# Patient Record
Sex: MALE | Race: OTHER | HISPANIC OR LATINO | Employment: UNEMPLOYED | URBAN - METROPOLITAN AREA
[De-identification: names, ages, dates, MRNs, and addresses within clinical notes are randomized per-mention and may not be internally consistent; named-entity substitution may affect disease eponyms.]

---

## 2023-10-20 ENCOUNTER — HOSPITAL ENCOUNTER (INPATIENT)
Facility: HOSPITAL | Age: 33
LOS: 2 days | Discharge: HOME | DRG: 137 | End: 2023-10-22
Attending: EMERGENCY MEDICINE | Admitting: HOSPITALIST

## 2023-10-20 ENCOUNTER — APPOINTMENT (EMERGENCY)
Dept: CT IMAGING | Facility: HOSPITAL | Age: 33
DRG: 137 | End: 2023-10-20

## 2023-10-20 DIAGNOSIS — K04.7 DENTAL ABSCESS: Primary | ICD-10-CM

## 2023-10-20 DIAGNOSIS — M27.2 SUBPERIOSTEAL ABSCESS OF JAW: ICD-10-CM

## 2023-10-20 DIAGNOSIS — L03.90 CELLULITIS: ICD-10-CM

## 2023-10-20 LAB
ALBUMIN SERPL BCP-MCNC: 4.3 G/DL (ref 3.5–5)
ALP SERPL-CCNC: 59 U/L (ref 34–104)
ALT SERPL W P-5'-P-CCNC: 14 U/L (ref 7–52)
ANION GAP SERPL CALCULATED.3IONS-SCNC: 7 MMOL/L
APTT PPP: 26 SECONDS (ref 23–37)
AST SERPL W P-5'-P-CCNC: 19 U/L (ref 13–39)
ATRIAL RATE: 99 BPM
BASOPHILS # BLD AUTO: 0.02 THOUSANDS/ÂΜL (ref 0–0.1)
BASOPHILS NFR BLD AUTO: 0 % (ref 0–1)
BILIRUB SERPL-MCNC: 0.85 MG/DL (ref 0.2–1)
BUN SERPL-MCNC: 9 MG/DL (ref 5–25)
CALCIUM SERPL-MCNC: 8.8 MG/DL (ref 8.4–10.2)
CHLORIDE SERPL-SCNC: 100 MMOL/L (ref 96–108)
CO2 SERPL-SCNC: 27 MMOL/L (ref 21–32)
CREAT SERPL-MCNC: 0.68 MG/DL (ref 0.6–1.3)
EOSINOPHIL # BLD AUTO: 0.02 THOUSAND/ÂΜL (ref 0–0.61)
EOSINOPHIL NFR BLD AUTO: 0 % (ref 0–6)
ERYTHROCYTE [DISTWIDTH] IN BLOOD BY AUTOMATED COUNT: 15.6 % (ref 11.6–15.1)
GFR SERPL CREATININE-BSD FRML MDRD: 123 ML/MIN/1.73SQ M
GLUCOSE SERPL-MCNC: 92 MG/DL (ref 65–140)
HCT VFR BLD AUTO: 42.7 % (ref 36.5–49.3)
HGB BLD-MCNC: 14.3 G/DL (ref 12–17)
IMM GRANULOCYTES # BLD AUTO: 0.02 THOUSAND/UL (ref 0–0.2)
IMM GRANULOCYTES NFR BLD AUTO: 0 % (ref 0–2)
INR PPP: 0.94 (ref 0.84–1.19)
LACTATE SERPL-SCNC: 1.4 MMOL/L (ref 0.5–2)
LYMPHOCYTES # BLD AUTO: 1.53 THOUSANDS/ÂΜL (ref 0.6–4.47)
LYMPHOCYTES NFR BLD AUTO: 19 % (ref 14–44)
MCH RBC QN AUTO: 28.8 PG (ref 26.8–34.3)
MCHC RBC AUTO-ENTMCNC: 33.5 G/DL (ref 31.4–37.4)
MCV RBC AUTO: 86 FL (ref 82–98)
MONOCYTES # BLD AUTO: 0.71 THOUSAND/ÂΜL (ref 0.17–1.22)
MONOCYTES NFR BLD AUTO: 9 % (ref 4–12)
NEUTROPHILS # BLD AUTO: 5.8 THOUSANDS/ÂΜL (ref 1.85–7.62)
NEUTS SEG NFR BLD AUTO: 72 % (ref 43–75)
NRBC BLD AUTO-RTO: 0 /100 WBCS
P AXIS: 50 DEGREES
PLATELET # BLD AUTO: 240 THOUSANDS/UL (ref 149–390)
PMV BLD AUTO: 9.5 FL (ref 8.9–12.7)
POTASSIUM SERPL-SCNC: 3.8 MMOL/L (ref 3.5–5.3)
PR INTERVAL: 162 MS
PROCALCITONIN SERPL-MCNC: <0.05 NG/ML
PROT SERPL-MCNC: 8.2 G/DL (ref 6.4–8.4)
PROTHROMBIN TIME: 13.1 SECONDS (ref 11.6–14.5)
QRS AXIS: 74 DEGREES
QRSD INTERVAL: 84 MS
QT INTERVAL: 326 MS
QTC INTERVAL: 418 MS
RBC # BLD AUTO: 4.97 MILLION/UL (ref 3.88–5.62)
SODIUM SERPL-SCNC: 134 MMOL/L (ref 135–147)
T WAVE AXIS: 41 DEGREES
VENTRICULAR RATE: 99 BPM
WBC # BLD AUTO: 8.1 THOUSAND/UL (ref 4.31–10.16)

## 2023-10-20 PROCEDURE — 85730 THROMBOPLASTIN TIME PARTIAL: CPT

## 2023-10-20 PROCEDURE — 93005 ELECTROCARDIOGRAM TRACING: CPT

## 2023-10-20 PROCEDURE — 85610 PROTHROMBIN TIME: CPT

## 2023-10-20 PROCEDURE — 70487 CT MAXILLOFACIAL W/DYE: CPT

## 2023-10-20 PROCEDURE — 96375 TX/PRO/DX INJ NEW DRUG ADDON: CPT

## 2023-10-20 PROCEDURE — 85025 COMPLETE CBC W/AUTO DIFF WBC: CPT

## 2023-10-20 PROCEDURE — 99284 EMERGENCY DEPT VISIT MOD MDM: CPT

## 2023-10-20 PROCEDURE — 87040 BLOOD CULTURE FOR BACTERIA: CPT

## 2023-10-20 PROCEDURE — 84145 PROCALCITONIN (PCT): CPT

## 2023-10-20 PROCEDURE — 36415 COLL VENOUS BLD VENIPUNCTURE: CPT

## 2023-10-20 PROCEDURE — 99285 EMERGENCY DEPT VISIT HI MDM: CPT | Performed by: EMERGENCY MEDICINE

## 2023-10-20 PROCEDURE — 96365 THER/PROPH/DIAG IV INF INIT: CPT

## 2023-10-20 PROCEDURE — 93010 ELECTROCARDIOGRAM REPORT: CPT | Performed by: INTERNAL MEDICINE

## 2023-10-20 PROCEDURE — 99222 1ST HOSP IP/OBS MODERATE 55: CPT | Performed by: HOSPITALIST

## 2023-10-20 PROCEDURE — 96361 HYDRATE IV INFUSION ADD-ON: CPT

## 2023-10-20 PROCEDURE — 80053 COMPREHEN METABOLIC PANEL: CPT

## 2023-10-20 PROCEDURE — 83605 ASSAY OF LACTIC ACID: CPT

## 2023-10-20 RX ORDER — KETOROLAC TROMETHAMINE 30 MG/ML
15 INJECTION, SOLUTION INTRAMUSCULAR; INTRAVENOUS ONCE
Status: COMPLETED | OUTPATIENT
Start: 2023-10-20 | End: 2023-10-20

## 2023-10-20 RX ORDER — KETOROLAC TROMETHAMINE 30 MG/ML
15 INJECTION, SOLUTION INTRAMUSCULAR; INTRAVENOUS EVERY 6 HOURS PRN
Status: DISCONTINUED | OUTPATIENT
Start: 2023-10-20 | End: 2023-10-20

## 2023-10-20 RX ORDER — KETOROLAC TROMETHAMINE 30 MG/ML
15 INJECTION, SOLUTION INTRAMUSCULAR; INTRAVENOUS EVERY 6 HOURS PRN
Status: DISCONTINUED | OUTPATIENT
Start: 2023-10-20 | End: 2023-10-22 | Stop reason: HOSPADM

## 2023-10-20 RX ORDER — ACETAMINOPHEN 325 MG/1
975 TABLET ORAL EVERY 6 HOURS PRN
Status: DISCONTINUED | OUTPATIENT
Start: 2023-10-20 | End: 2023-10-22 | Stop reason: HOSPADM

## 2023-10-20 RX ADMIN — KETOROLAC TROMETHAMINE 15 MG: 30 INJECTION, SOLUTION INTRAMUSCULAR; INTRAVENOUS at 13:37

## 2023-10-20 RX ADMIN — SODIUM CHLORIDE 1000 ML: 0.9 INJECTION, SOLUTION INTRAVENOUS at 13:35

## 2023-10-20 RX ADMIN — SODIUM CHLORIDE 3 G: 9 INJECTION, SOLUTION INTRAVENOUS at 13:37

## 2023-10-20 RX ADMIN — SODIUM CHLORIDE 3 G: 9 INJECTION, SOLUTION INTRAVENOUS at 20:58

## 2023-10-20 RX ADMIN — IOHEXOL 80 ML: 350 INJECTION, SOLUTION INTRAVENOUS at 15:33

## 2023-10-20 NOTE — H&P
[address] [facility]  H&P  Name: Martínez Pressley 26 y.o. male I MRN: [id number]  Unit/Bed#: W -92 I Date of Admission: 10/20/2023   Date of Service: 10/20/2023 I Hospital Day: 0      Assessment/Plan   * Subperiosteal abscess of jaw  Assessment & Plan  Patient presented with worsening right-sided facial swelling and tenderness. He said it has been progressing over the past 2 days. He said he has difficulty with eating/drinking. Denies having any prior history of dental abscess or or facial abscess. Otherwise denies having any fever/chills, headaches, changes in vision, rhinorrhea or cough. On exam he has a large amount of facial swelling and very tender to palpation. Labs were ordered and CBC, Pro-Osorio, lactic acid, BMP, PT/PTT unremarkable  EKG normal sinus rhythm heart rate of 99  CT facial bone with contrast:  1.3 cm subperiosteal abscess along right maxillary alveolar ridge adjacent to maxillary right first premolar tooth (likely odontogenic source of infection) with diffuse acute right facial cellulitis. Recommend dental consultation for further evaluation. 2.4 cm subcutaneous lesion in left lateral cheek region adjacent to the left parotid gland, may be inflamed sebaceous/epidermal inclusion cyst. Direct visualization recommended. Moderate right maxillary sinus disease. Periodontal disease in maxillary right molar teeth may be odontogenic source of sinusitis. Multiple bilateral mildly enlarged cervical lymph nodes, likely reactive cervical lymphadenopathy. Recommend clinical follow-up to resolution.   In the ED patient received a dose of Unasyn, ketorolac 15 mg IV, 1 L bolus of IV fluids    Plan:  Tylenol for mild pain and ketorolac for moderate to severe pain as needed  Continue Unasyn 3g Q6H  Follow-up on blood cultures and a.m. labs  Patient will benefit from OMFS/dental consult for possible I&D       VTE Pharmacologic Prophylaxis:   Low Risk (Score 0-2) - Encourage Ambulation. Code Status: Level 1 - Full Code   Discussion with family: Patient declined call to . Anticipated Length of Stay: Patient will be admitted on an observation basis with an anticipated length of stay of less than 2 midnights secondary to  . Chief Complaint: Oral pain    History of Present Illness:  Yung Ramirez is a 26 y.o. male with no significant past medical history presents to the ED due to oral pain. He said he started having facial swelling and tenderness that started 2 days ago and has been progressed rapidly. He mentions having difficulty with eating/drinking. Does not recall any factors that could have exacerbated his facial swelling. Does not report doing anything unusual in the past week. Otherwise patient denies having any fever/chills, headache, changes in vision, cough, rhinorrhea, chest pain, shortness of breath, abdominal pain, dysuria or recent sick contacts. Review of Systems:  Review of Systems   Constitutional:  Negative for chills and fever. HENT:  Positive for dental problem and facial swelling. Negative for ear pain and sore throat. Eyes:  Negative for pain and visual disturbance. Respiratory:  Negative for cough and shortness of breath. Cardiovascular:  Negative for chest pain and palpitations. Gastrointestinal:  Negative for abdominal pain and vomiting. Genitourinary:  Negative for dysuria and hematuria. Musculoskeletal:  Negative for arthralgias and back pain. Skin:  Negative for color change and rash. Neurological:  Negative for seizures and syncope. All other systems reviewed and are negative. Past Medical and Surgical History:   History reviewed. No pertinent past medical history. History reviewed. No pertinent surgical history. Meds/Allergies:  Prior to Admission medications    Not on File     I have reviewed home medications with patient personally.     Allergies: No Known Allergies    Social History:  Marital Status: Single   Occupation:   Patient Pre-hospital Living Situation:   Patient Pre-hospital Level of Mobility:   Patient Pre-hospital Diet Restrictions:   Substance Use History:   Social History     Substance and Sexual Activity   Alcohol Use Yes    Comment: social     Social History     Tobacco Use   Smoking Status Every Day    Packs/day: 0.25    Types: Cigarettes   Smokeless Tobacco Never     Social History     Substance and Sexual Activity   Drug Use Not Currently       Family History:      Physical Exam:     Vitals:   Blood Pressure: 137/95 (10/20/23 1832)  Pulse: 85 (10/20/23 1832)  Temperature: 98.6 °F (37 °C) (10/20/23 1832)  Temp Source: Oral (10/20/23 1255)  Respirations: 20 (10/20/23 1832)  Weight - Scale: 63.5 kg (140 lb) (10/20/23 1255)  SpO2: 96 % (10/20/23 1832)    Physical Exam  Vitals and nursing note reviewed. Constitutional:       General: He is not in acute distress. Appearance: He is well-developed. HENT:      Head: Normocephalic and atraumatic. Mouth/Throat:      Pharynx: Posterior oropharyngeal erythema (Right-sided facial swelling and erythema. Along with gingival swelling, concern for a dental abscess.) present. Eyes:      Conjunctiva/sclera: Conjunctivae normal.   Cardiovascular:      Rate and Rhythm: Normal rate and regular rhythm. Pulses: Normal pulses. Heart sounds: Normal heart sounds. No murmur heard. Pulmonary:      Effort: Pulmonary effort is normal. No respiratory distress. Breath sounds: Normal breath sounds. Abdominal:      Palpations: Abdomen is soft. Tenderness: There is no abdominal tenderness. Musculoskeletal:         General: No swelling. Cervical back: Neck supple. Skin:     General: Skin is warm and dry. Capillary Refill: Capillary refill takes less than 2 seconds. Neurological:      Mental Status: He is alert and oriented to person, place, and time.    Psychiatric:         Mood and Affect: Mood normal. Behavior: Behavior normal.          Additional Data:     Lab Results:  Results from last 7 days   Lab Units 10/20/23  1332   WBC Thousand/uL 8.10   HEMOGLOBIN g/dL 14.3   HEMATOCRIT % 42.7   PLATELETS Thousands/uL 240   NEUTROS PCT % 72   LYMPHS PCT % 19   MONOS PCT % 9   EOS PCT % 0     Results from last 7 days   Lab Units 10/20/23  1332   SODIUM mmol/L 134*   POTASSIUM mmol/L 3.8   CHLORIDE mmol/L 100   CO2 mmol/L 27   BUN mg/dL 9   CREATININE mg/dL 0.68   ANION GAP mmol/L 7   CALCIUM mg/dL 8.8   ALBUMIN g/dL 4.3   TOTAL BILIRUBIN mg/dL 0.85   ALK PHOS U/L 59   ALT U/L 14   AST U/L 19   GLUCOSE RANDOM mg/dL 92     Results from last 7 days   Lab Units 10/20/23  1332   INR  0.94             Results from last 7 days   Lab Units 10/20/23  1332   LACTIC ACID mmol/L 1.4   PROCALCITONIN ng/ml <0.05       Lines/Drains:  Invasive Devices       Peripheral Intravenous Line  Duration             Peripheral IV 10/20/23 Left Wrist <1 day    Peripheral IV 10/20/23 Right;Upper Arm <1 day                        Imaging: Reviewed radiology reports from this admission including: CT facial bone with contrast  CT facial bones with contrast   Final Result by Yadiel Cerda MD (10/20 1628)      1.3 cm subperiosteal abscess along right maxillary alveolar ridge adjacent to maxillary right first premolar tooth (likely odontogenic source of infection) with diffuse acute right facial cellulitis. Recommend dental consultation for further evaluation. 2.4 cm subcutaneous lesion in left lateral cheek region adjacent to the left parotid gland, may be inflamed sebaceous/epidermal inclusion cyst. Direct visualization recommended. Moderate right maxillary sinus disease. Periodontal disease in maxillary right molar teeth may be odontogenic source of sinusitis. Multiple bilateral mildly enlarged cervical lymph nodes, likely reactive cervical lymphadenopathy. Recommend clinical follow-up to resolution.       The study was marked in EPIC for immediate notification. Workstation performed: [id number]             EKG and Other Studies Reviewed on Admission:   EKG: NSR. HR 99.    ** Please Note: This note has been constructed using a voice recognition system.  **

## 2023-10-20 NOTE — ED CARE HANDOFF
Emergency Department Sign Out Note        Sign out and transfer of care from Dr. Mil Lopez. See Separate Emergency Department note. The patient, Eboni Jarrell, was evaluated by the previous provider for facial swelling, dental infection. Workup Completed:  Labs. ED Course / Workup Pending (followup):  CT facial bones pending. CT scan concerning for subperiosteal abscess with right facial cellulitis requiring dental consultation. Patient admitted to hospitalist service for further treatment. CT facial bones with contrast   Final Result by Bonnye Aschoff, MD (10/20 1628)      1.3 cm subperiosteal abscess along right maxillary alveolar ridge adjacent to maxillary right first premolar tooth (likely odontogenic source of infection) with diffuse acute right facial cellulitis. Recommend dental consultation for further evaluation. 2.4 cm subcutaneous lesion in left lateral cheek region adjacent to the left parotid gland, may be inflamed sebaceous/epidermal inclusion cyst. Direct visualization recommended. Moderate right maxillary sinus disease. Periodontal disease in maxillary right molar teeth may be odontogenic source of sinusitis. Multiple bilateral mildly enlarged cervical lymph nodes, likely reactive cervical lymphadenopathy. Recommend clinical follow-up to resolution. The study was marked in [facility] for immediate notification. Workstation performed: [id number]                                                Procedures  Medical Decision Making  Amount and/or Complexity of Data Reviewed  Labs: ordered. Radiology: ordered. Risk  Prescription drug management. Decision regarding hospitalization.             Disposition  Final diagnoses:   Dental abscess   Cellulitis     Time reflects when diagnosis was documented in both MDM as applicable and the Disposition within this note       Time User Action Codes Description Comment    10/20/2023  5:37 PM Aldo Dobson Add [K04.7] Dental abscess     10/20/2023  5:37 PM Arvizubowen Hartleyvijay Add [L03.90] Cellulitis     10/20/2023  7:06 PM Armin Jenkins Add [M27.2] Subperiosteal abscess of jaw           ED Disposition       ED Disposition   Admit    Condition   Stable    Date/Time   Fri Oct 20, 2023  5:37 PM    Comment   Case was discussed with [facility]I and the patient's admission status was agreed to be Admission Status: inpatient status to the service of Dr. Isaiah Corey . Follow-up Information    None       There are no discharge medications for this patient. No discharge procedures on file.        ED Provider  Electronically Signed by     Nelida Ibrahim MD  10/20/23 7564

## 2023-10-20 NOTE — ED ATTENDING ATTESTATION
10/20/2023  IWinter DO, saw and evaluated the patient. I have discussed the patient with the resident/non-physician practitioner and agree with the resident's/non-physician practitioner's findings, Plan of Care, and MDM as documented in the resident's/non-physician practitioner's note, except where noted. All available labs and Radiology studies were reviewed. I was present for key portions of any procedure(s) performed by the resident/non-physician practitioner and I was immediately available to provide assistance. At this point I agree with the current assessment done in the Emergency Department.   I have conducted an independent evaluation of this patient a history and physical is as follows:      47-year-old male, sided facial swelling, progressive over the past few weeks greatly worsened over the past few days, having a difficult time eating and drinking,      On exam large amount of right facial swelling appears to be originating from dental abscess from right upper canine very tender to palpation there does appear to be distinct head of this abscess in the upper gum      Plan will be to CT possible I&D versus admission                    ED Course         Critical Care Time  Procedures

## 2023-10-20 NOTE — ED CARE HANDOFF
Emergency Department Sign Out Note        Sign out and transfer of care from Dr. Vitaliy Serna. See Separate Emergency Department note. The patient, Dayna Iqbal, was evaluated by the previous provider for facial swelling. Workup Completed:  CBC, CMP, coags, lactate, Pro-Osorio, EKG    ED Course / Workup Pending (followup):  Pending CT read by radiologist.  Possible abscess with dispo depending on read. Admit likely either way, either with OMS drainage inpatient or drainage done here in the ED. CT came back reading 1.3 cm subperiosteal abscess along right maxillary alveolar ridge adjacent to maxillary right first premolar tooth. Nothing to drain here in the ED. Will admit to [facility] for further evaluation and OMFS/dental consult. ED Course as of 10/20/23 1743   Fri Oct 20, 2023   1554 SO- Facial swelling and pain right side. Poor dentition, possible dental/facial abscess, pending CT read. Got Unasyn, if deep abscess consult OMFS, possible drainage     Procedures  Medical Decision Making  Amount and/or Complexity of Data Reviewed  Labs: ordered. Radiology: ordered. Risk  Prescription drug management. Disposition  Final diagnoses:   Dental abscess   Cellulitis     Time reflects when diagnosis was documented in both MDM as applicable and the Disposition within this note       Time User Action Codes Description Comment    10/20/2023  5:37 PM Ariel Victor Add [K04.7] Dental abscess     10/20/2023  5:37 PM Ariel Victor Add [L03.90] Cellulitis           ED Disposition       ED Disposition   Admit    Condition   Stable    Date/Time   Fri Oct 20, 2023  5:37 PM    Comment   Case was discussed with SLIM and the patient's admission status was agreed to be Admission Status: inpatient status to the service of Dr. Michell Landeros .                Follow-up Information    None       Patient's Medications    No medications on file     No discharge procedures on file.       ED Provider  Electronically Signed by     Kleber Reinoso DO  10/20/23 6067

## 2023-10-20 NOTE — ED PROVIDER NOTES
History  Chief Complaint   Patient presents with    Oral Swelling     Pt comes to ED c/o R dental swelling starting yesterday and worsening today. Burton Hadley is a 47-year-old male presenting with worsening right-sided facial swelling and tenderness. Symptoms began 2 days ago with tenderness of one of his right superior molars, he does endorse abnormal dentition in this area. Yesterday, he began with some increasing pain in the right maxillary region, and when he awoke this morning he had significant swelling in the right maxillary region with worsening pain. No reported fevers. Pain with mastication. No prior history of dental abscess or facial abscess. He did not take any medication for discomfort prior to arrival.  He does not have any long-term medical problems, no allergies to medications. History provided by:  Patient   used: No        None       History reviewed. No pertinent past medical history. History reviewed. No pertinent surgical history. History reviewed. No pertinent family history. I have reviewed and agree with the history as documented. E-Cigarette/Vaping    E-Cigarette Use Never User      E-Cigarette/Vaping Substances     Social History     Tobacco Use    Smoking status: Every Day     Packs/day: 0.25     Types: Cigarettes    Smokeless tobacco: Never   Vaping Use    Vaping Use: Never used   Substance Use Topics    Alcohol use: Yes     Comment: social    Drug use: Not Currently        Review of Systems   Constitutional:  Negative for chills and fever. HENT:  Positive for dental problem. Negative for ear pain and sore throat. Eyes:  Negative for pain and visual disturbance. Respiratory:  Negative for cough and shortness of breath. Cardiovascular:  Negative for chest pain and palpitations. Gastrointestinal:  Negative for abdominal pain and vomiting. Genitourinary:  Negative for dysuria and hematuria.    Musculoskeletal:  Negative for arthralgias and back pain. Skin:  Negative for color change and rash. Neurological:  Negative for seizures and syncope. All other systems reviewed and are negative. Physical Exam  ED Triage Vitals [10/20/23 1255]   Temperature Pulse Respirations Blood Pressure SpO2   98.1 °F (36.7 °C) (!) 109 18 (!) 180/106 99 %      Temp Source Heart Rate Source Patient Position - Orthostatic VS BP Location FiO2 (%)   Oral Monitor -- -- --      Pain Score       8             Orthostatic Vital Signs  Vitals:    10/20/23 1255 10/20/23 1400   BP: (!) 180/106 149/100   Pulse: (!) 109 88       Physical Exam  Vitals and nursing note reviewed. Constitutional:       Appearance: He is ill-appearing. HENT:      Head: Normocephalic and atraumatic. Right Ear: External ear normal.      Left Ear: External ear normal.      Mouth/Throat:      Mouth: Mucous membranes are moist.      Dentition: Abnormal dentition. Dental tenderness, gingival swelling and dental caries present. Pharynx: Oropharynx is clear. Comments: Patient with right-sided facial swelling over the maxillary region with some mild erythema at the site. He has abnormal dentition with a dental carry of one of his superior molars where there is gingival swelling, concern for a dental abscess. There is tenderness in this region. Eyes:      General: No scleral icterus. Conjunctiva/sclera: Conjunctivae normal.   Cardiovascular:      Rate and Rhythm: Regular rhythm. Tachycardia present. Pulses: Normal pulses. Heart sounds: Normal heart sounds. Pulmonary:      Effort: Pulmonary effort is normal. No respiratory distress. Breath sounds: Normal breath sounds. Abdominal:      General: Abdomen is flat. There is no distension. Tenderness: There is no abdominal tenderness. Musculoskeletal:         General: No tenderness or signs of injury. Cervical back: Neck supple. No rigidity. Skin:     General: Skin is warm.       Coloration: Skin is not jaundiced. Findings: No erythema or rash. Neurological:      General: No focal deficit present. Mental Status: He is alert. Mental status is at baseline. Psychiatric:         Mood and Affect: Mood normal.         Behavior: Behavior normal.         ED Medications  Medications   ampicillin-sulbactam (UNASYN) 3 g in sodium chloride 0.9 % 100 mL IVPB (0 g Intravenous Stopped 10/20/23 1407)   ketorolac (TORADOL) injection 15 mg (15 mg Intravenous Given 10/20/23 1337)   sodium chloride 0.9 % bolus 1,000 mL (0 mL Intravenous Stopped 10/20/23 1445)   iohexol (OMNIPAQUE) 350 MG/ML injection (MULTI-DOSE) 80 mL (80 mL Intravenous Given 10/20/23 1533)       Diagnostic Studies  Results Reviewed       Procedure Component Value Units Date/Time    Procalcitonin [[id number]]  (Normal) Collected: 10/20/23 1332    Lab Status: Final result Specimen: Blood from Arm, Right Updated: 10/20/23 1415     Procalcitonin <0.05 ng/ml     Lactic acid [[id number]]  (Normal) Collected: 10/20/23 1332    Lab Status: Final result Specimen: Blood from Arm, Right Updated: 10/20/23 1406     LACTIC ACID 1.4 mmol/L     Narrative:      Result may be elevated if tourniquet was used during collection.     Comprehensive metabolic panel [[id number]]  (Abnormal) Collected: 10/20/23 1332    Lab Status: Final result Specimen: Blood from Arm, Right Updated: 10/20/23 1406     Sodium 134 mmol/L      Potassium 3.8 mmol/L      Chloride 100 mmol/L      CO2 27 mmol/L      ANION GAP 7 mmol/L      BUN 9 mg/dL      Creatinine 0.68 mg/dL      Glucose 92 mg/dL      Calcium 8.8 mg/dL      AST 19 U/L      ALT 14 U/L      Alkaline Phosphatase 59 U/L      Total Protein 8.2 g/dL      Albumin 4.3 g/dL      Total Bilirubin 0.85 mg/dL      eGFR 123 ml/min/1.73sq m     Narrative:      [place] guidelines for Chronic Kidney Disease (CKD):     Stage 1 with normal or high GFR (GFR > 90 mL/min/1.73 square meters)    Stage 2 Mild CKD (GFR = 60-89 mL/min/1.73 square meters)    Stage 3A Moderate CKD (GFR = 45-59 mL/min/1.73 square meters)    Stage 3B Moderate CKD (GFR = 30-44 mL/min/1.73 square meters)    Stage 4 Severe CKD (GFR = 15-29 mL/min/1.73 square meters)    Stage 5 End Stage CKD (GFR <15 mL/min/1.73 square meters)  Note: GFR calculation is accurate only with a steady state creatinine    Protime-INR [[id number]]  (Normal) Collected: 10/20/23 1332    Lab Status: Final result Specimen: Blood from Arm, Right Updated: 10/20/23 1402     Protime 13.1 seconds      INR 0.94    APTT [[id number]]  (Normal) Collected: 10/20/23 1332    Lab Status: Final result Specimen: Blood from Arm, Right Updated: 10/20/23 1402     PTT 26 seconds     CBC and differential [[id number]]  (Abnormal) Collected: 10/20/23 1332    Lab Status: Final result Specimen: Blood from Arm, Right Updated: 10/20/23 1347     WBC 8.10 Thousand/uL      RBC 4.97 Million/uL      Hemoglobin 14.3 g/dL      Hematocrit 42.7 %      MCV 86 fL      MCH 28.8 pg      MCHC 33.5 g/dL      RDW 15.6 %      MPV 9.5 fL      Platelets 695 Thousands/uL      nRBC 0 /100 WBCs      Neutrophils Relative 72 %      Immat GRANS % 0 %      Lymphocytes Relative 19 %      Monocytes Relative 9 %      Eosinophils Relative 0 %      Basophils Relative 0 %      Neutrophils Absolute 5.80 Thousands/µL      Immature Grans Absolute 0.02 Thousand/uL      Lymphocytes Absolute 1.53 Thousands/µL      Monocytes Absolute 0.71 Thousand/µL      Eosinophils Absolute 0.02 Thousand/µL      Basophils Absolute 0.02 Thousands/µL     Blood culture #2 [[id number]] Collected: 10/20/23 1332    Lab Status: In process Specimen: Blood from Arm, Left Updated: 10/20/23 1342    Blood culture #1 [[id number]] Collected: 10/20/23 1332    Lab Status:  In process Specimen: Blood from Arm, Right Updated: 10/20/23 1341                   CT facial bones with contrast    (Results Pending)         Procedures  Procedures      ED Course Medical Decision Making  Kathe Deleon is a 66-year-old male presenting with worsening right-sided facial swelling and tenderness. He endorses abnormal dentition in the region. No reported fevers. Swelling is worsened over the last 24 hours. On exam, patient has significant swelling over the right maxillary region, he does have normal dentition of right superior molar with gingival edema, likely represents dental abscess versus developing facial abscess. Patient was tachycardic on arrival, had concern for possible sepsis secondary to this acute infection. Sepsis labs were drawn, patient was given IV Unasyn as well as an IV fluid bolus, Toradol for his discomfort. Patient's lab work was entirely unremarkable, no leukocytosis. CT scan pending, likely to be admitted for maxillofacial consult. If abscess/infection is not deep-seeded, bedside I&D may be appropriate. Signed out to Dr. Lord Reason pending CT scan. Amount and/or Complexity of Data Reviewed  Labs: ordered. Radiology: ordered. Risk  Prescription drug management. Disposition  Final diagnoses:   None     ED Disposition       None          Follow-up Information    None         Patient's Medications    No medications on file     No discharge procedures on file. PDMP Review       None             ED Provider  Attending physically available and evaluated Ibrahima Kaye. I managed the patient along with the ED Attending.     Electronically Signed by           Alejandra Dixon DO  10/20/23 6749

## 2023-10-20 NOTE — ASSESSMENT & PLAN NOTE
Patient presented with worsening right-sided facial swelling and tenderness. He said it has been progressing over the past 2 days. He said he has difficulty with eating/drinking. Denies having any prior history of dental abscess or or facial abscess. Otherwise denies having any fever/chills, headaches, changes in vision, rhinorrhea or cough. On exam he has a large amount of facial swelling and very tender to palpation. Labs were ordered and CBC, Pro-Osorio, lactic acid, BMP, PT/PTT unremarkable  EKG normal sinus rhythm heart rate of 99  CT facial bone with contrast:  1.3 cm subperiosteal abscess along right maxillary alveolar ridge adjacent to maxillary right first premolar tooth (likely odontogenic source of infection) with diffuse acute right facial cellulitis. Recommend dental consultation for further evaluation. 2.4 cm subcutaneous lesion in left lateral cheek region adjacent to the left parotid gland, may be inflamed sebaceous/epidermal inclusion cyst. Direct visualization recommended. Moderate right maxillary sinus disease. Periodontal disease in maxillary right molar teeth may be odontogenic source of sinusitis. Multiple bilateral mildly enlarged cervical lymph nodes, likely reactive cervical lymphadenopathy. Recommend clinical follow-up to resolution.   In the ED patient received a dose of Unasyn, ketorolac 15 mg IV, 1 L bolus of IV fluids    Plan:  Tylenol for mild pain and ketorolac for moderate to severe pain as needed  Continue Unasyn 3g Q6H  Follow-up on blood cultures and a.m. labs  Patient will benefit from OMFS/dental consult for possible I&D

## 2023-10-21 ENCOUNTER — ANESTHESIA EVENT (INPATIENT)
Dept: PERIOP | Facility: HOSPITAL | Age: 33
End: 2023-10-21

## 2023-10-21 ENCOUNTER — ANESTHESIA (INPATIENT)
Dept: PERIOP | Facility: HOSPITAL | Age: 33
End: 2023-10-21

## 2023-10-21 LAB
ANION GAP SERPL CALCULATED.3IONS-SCNC: 6 MMOL/L
BASOPHILS # BLD AUTO: 0.02 THOUSANDS/ÂΜL (ref 0–0.1)
BASOPHILS NFR BLD AUTO: 0 % (ref 0–1)
BUN SERPL-MCNC: 7 MG/DL (ref 5–25)
CALCIUM SERPL-MCNC: 9 MG/DL (ref 8.4–10.2)
CHLORIDE SERPL-SCNC: 104 MMOL/L (ref 96–108)
CO2 SERPL-SCNC: 25 MMOL/L (ref 21–32)
CREAT SERPL-MCNC: 0.68 MG/DL (ref 0.6–1.3)
EOSINOPHIL # BLD AUTO: 0.04 THOUSAND/ÂΜL (ref 0–0.61)
EOSINOPHIL NFR BLD AUTO: 1 % (ref 0–6)
ERYTHROCYTE [DISTWIDTH] IN BLOOD BY AUTOMATED COUNT: 15.7 % (ref 11.6–15.1)
GFR SERPL CREATININE-BSD FRML MDRD: 123 ML/MIN/1.73SQ M
GLUCOSE SERPL-MCNC: 90 MG/DL (ref 65–140)
GLUCOSE SERPL-MCNC: 99 MG/DL (ref 65–140)
HCT VFR BLD AUTO: 43.6 % (ref 36.5–49.3)
HGB BLD-MCNC: 14.4 G/DL (ref 12–17)
IMM GRANULOCYTES # BLD AUTO: 0.02 THOUSAND/UL (ref 0–0.2)
IMM GRANULOCYTES NFR BLD AUTO: 0 % (ref 0–2)
LYMPHOCYTES # BLD AUTO: 1.45 THOUSANDS/ÂΜL (ref 0.6–4.47)
LYMPHOCYTES NFR BLD AUTO: 22 % (ref 14–44)
MCH RBC QN AUTO: 28.7 PG (ref 26.8–34.3)
MCHC RBC AUTO-ENTMCNC: 33 G/DL (ref 31.4–37.4)
MCV RBC AUTO: 87 FL (ref 82–98)
MONOCYTES # BLD AUTO: 0.68 THOUSAND/ÂΜL (ref 0.17–1.22)
MONOCYTES NFR BLD AUTO: 10 % (ref 4–12)
NEUTROPHILS # BLD AUTO: 4.5 THOUSANDS/ÂΜL (ref 1.85–7.62)
NEUTS SEG NFR BLD AUTO: 67 % (ref 43–75)
NRBC BLD AUTO-RTO: 0 /100 WBCS
PLATELET # BLD AUTO: 228 THOUSANDS/UL (ref 149–390)
PMV BLD AUTO: 9.3 FL (ref 8.9–12.7)
POTASSIUM SERPL-SCNC: 3.8 MMOL/L (ref 3.5–5.3)
RBC # BLD AUTO: 5.02 MILLION/UL (ref 3.88–5.62)
SODIUM SERPL-SCNC: 135 MMOL/L (ref 135–147)
WBC # BLD AUTO: 6.71 THOUSAND/UL (ref 4.31–10.16)

## 2023-10-21 PROCEDURE — 0CTW0Z1 RESECTION OF UPPER TOOTH, MULTIPLE, OPEN APPROACH: ICD-10-PCS | Performed by: DENTIST

## 2023-10-21 PROCEDURE — 99231 SBSQ HOSP IP/OBS SF/LOW 25: CPT | Performed by: INTERNAL MEDICINE

## 2023-10-21 PROCEDURE — 87075 CULTR BACTERIA EXCEPT BLOOD: CPT | Performed by: DENTIST

## 2023-10-21 PROCEDURE — 85025 COMPLETE CBC W/AUTO DIFF WBC: CPT

## 2023-10-21 PROCEDURE — 0W940ZZ DRAINAGE OF UPPER JAW, OPEN APPROACH: ICD-10-PCS | Performed by: DENTIST

## 2023-10-21 PROCEDURE — 80048 BASIC METABOLIC PNL TOTAL CA: CPT

## 2023-10-21 PROCEDURE — 87147 CULTURE TYPE IMMUNOLOGIC: CPT | Performed by: DENTIST

## 2023-10-21 PROCEDURE — 82948 REAGENT STRIP/BLOOD GLUCOSE: CPT

## 2023-10-21 PROCEDURE — 87070 CULTURE OTHR SPECIMN AEROBIC: CPT | Performed by: DENTIST

## 2023-10-21 PROCEDURE — 87205 SMEAR GRAM STAIN: CPT | Performed by: DENTIST

## 2023-10-21 RX ORDER — SUCCINYLCHOLINE/SOD CL,ISO/PF 100 MG/5ML
SYRINGE (ML) INTRAVENOUS AS NEEDED
Status: DISCONTINUED | OUTPATIENT
Start: 2023-10-21 | End: 2023-10-21

## 2023-10-21 RX ORDER — LIDOCAINE HYDROCHLORIDE 10 MG/ML
INJECTION, SOLUTION EPIDURAL; INFILTRATION; INTRACAUDAL; PERINEURAL AS NEEDED
Status: DISCONTINUED | OUTPATIENT
Start: 2023-10-21 | End: 2023-10-21

## 2023-10-21 RX ORDER — MAGNESIUM HYDROXIDE 1200 MG/15ML
LIQUID ORAL AS NEEDED
Status: DISCONTINUED | OUTPATIENT
Start: 2023-10-21 | End: 2023-10-21 | Stop reason: HOSPADM

## 2023-10-21 RX ORDER — BUPIVACAINE HYDROCHLORIDE AND EPINEPHRINE 5; 5 MG/ML; UG/ML
INJECTION, SOLUTION PERINEURAL AS NEEDED
Status: DISCONTINUED | OUTPATIENT
Start: 2023-10-21 | End: 2023-10-21 | Stop reason: HOSPADM

## 2023-10-21 RX ORDER — FENTANYL CITRATE 50 UG/ML
INJECTION, SOLUTION INTRAMUSCULAR; INTRAVENOUS AS NEEDED
Status: DISCONTINUED | OUTPATIENT
Start: 2023-10-21 | End: 2023-10-21

## 2023-10-21 RX ORDER — ONDANSETRON 2 MG/ML
4 INJECTION INTRAMUSCULAR; INTRAVENOUS ONCE AS NEEDED
Status: DISCONTINUED | OUTPATIENT
Start: 2023-10-21 | End: 2023-10-21 | Stop reason: HOSPADM

## 2023-10-21 RX ORDER — PROPOFOL 10 MG/ML
INJECTION, EMULSION INTRAVENOUS AS NEEDED
Status: DISCONTINUED | OUTPATIENT
Start: 2023-10-21 | End: 2023-10-21

## 2023-10-21 RX ORDER — LIDOCAINE HYDROCHLORIDE AND EPINEPHRINE 10; 10 MG/ML; UG/ML
INJECTION, SOLUTION INFILTRATION; PERINEURAL AS NEEDED
Status: DISCONTINUED | OUTPATIENT
Start: 2023-10-21 | End: 2023-10-21 | Stop reason: HOSPADM

## 2023-10-21 RX ORDER — HYDROMORPHONE HCL/PF 1 MG/ML
0.25 SYRINGE (ML) INJECTION
Status: DISCONTINUED | OUTPATIENT
Start: 2023-10-21 | End: 2023-10-21 | Stop reason: HOSPADM

## 2023-10-21 RX ORDER — DEXAMETHASONE SODIUM PHOSPHATE 4 MG/ML
INJECTION, SOLUTION INTRA-ARTICULAR; INTRALESIONAL; INTRAMUSCULAR; INTRAVENOUS; SOFT TISSUE AS NEEDED
Status: DISCONTINUED | OUTPATIENT
Start: 2023-10-21 | End: 2023-10-21

## 2023-10-21 RX ORDER — FENTANYL CITRATE/PF 50 MCG/ML
12.5 SYRINGE (ML) INJECTION
Status: DISCONTINUED | OUTPATIENT
Start: 2023-10-21 | End: 2023-10-21 | Stop reason: HOSPADM

## 2023-10-21 RX ORDER — CHLORHEXIDINE GLUCONATE ORAL RINSE 1.2 MG/ML
SOLUTION DENTAL
Status: COMPLETED
Start: 2023-10-21 | End: 2023-10-21

## 2023-10-21 RX ORDER — SODIUM CHLORIDE, SODIUM LACTATE, POTASSIUM CHLORIDE, CALCIUM CHLORIDE 600; 310; 30; 20 MG/100ML; MG/100ML; MG/100ML; MG/100ML
INJECTION, SOLUTION INTRAVENOUS CONTINUOUS PRN
Status: DISCONTINUED | OUTPATIENT
Start: 2023-10-21 | End: 2023-10-21

## 2023-10-21 RX ORDER — ONDANSETRON 2 MG/ML
INJECTION INTRAMUSCULAR; INTRAVENOUS AS NEEDED
Status: DISCONTINUED | OUTPATIENT
Start: 2023-10-21 | End: 2023-10-21

## 2023-10-21 RX ADMIN — Medication 80 MG: at 12:05

## 2023-10-21 RX ADMIN — SODIUM CHLORIDE 3 G: 9 INJECTION, SOLUTION INTRAVENOUS at 21:07

## 2023-10-21 RX ADMIN — ONDANSETRON 4 MG: 2 INJECTION INTRAMUSCULAR; INTRAVENOUS at 12:20

## 2023-10-21 RX ADMIN — SODIUM CHLORIDE 3 G: 9 INJECTION, SOLUTION INTRAVENOUS at 03:10

## 2023-10-21 RX ADMIN — LIDOCAINE HYDROCHLORIDE 5 ML: 10 INJECTION, SOLUTION EPIDURAL; INFILTRATION; INTRACAUDAL; PERINEURAL at 12:05

## 2023-10-21 RX ADMIN — FENTANYL CITRATE 50 MCG: 50 INJECTION INTRAMUSCULAR; INTRAVENOUS at 12:05

## 2023-10-21 RX ADMIN — CHLORHEXIDINE GLUCONATE 15 ML: 1.2 SOLUTION ORAL at 11:04

## 2023-10-21 RX ADMIN — PROPOFOL 120 MG: 10 INJECTION, EMULSION INTRAVENOUS at 12:05

## 2023-10-21 RX ADMIN — SODIUM CHLORIDE, SODIUM LACTATE, POTASSIUM CHLORIDE, AND CALCIUM CHLORIDE: .6; .31; .03; .02 INJECTION, SOLUTION INTRAVENOUS at 12:00

## 2023-10-21 RX ADMIN — SODIUM CHLORIDE 3 G: 9 INJECTION, SOLUTION INTRAVENOUS at 14:09

## 2023-10-21 RX ADMIN — PROPOFOL 30 MG: 10 INJECTION, EMULSION INTRAVENOUS at 12:13

## 2023-10-21 RX ADMIN — SODIUM CHLORIDE 3 G: 9 INJECTION, SOLUTION INTRAVENOUS at 08:13

## 2023-10-21 RX ADMIN — DEXAMETHASONE SODIUM PHOSPHATE 10 MG: 4 INJECTION INTRA-ARTICULAR; INTRALESIONAL; INTRAMUSCULAR; INTRAVENOUS; SOFT TISSUE at 12:18

## 2023-10-21 RX ADMIN — FENTANYL CITRATE 50 MCG: 50 INJECTION INTRAMUSCULAR; INTRAVENOUS at 12:13

## 2023-10-21 NOTE — CONSULTS
Oral and Maxillofacial Surgery Consult    Pt seen 10/21/23 11:02 AM     HPI: 26 y.o. male who denies PMH presents to  [facility] s/p facial swelling and dental pain. Consult requested by medicine for evaluation of facial swelling. Pt states 2-3 days ago, his face started swelling up. Pt complains of dental pain the area of URQ. Pain 4/10. Pt does not receive regular dental care. Denies fever, chills, nausea, odynophagia, dysphagia, dyspnea, shortness of breath. PMH:   History reviewed. No pertinent past medical history. Allergies:   No Known Allergies    Meds:     Current Facility-Administered Medications:     acetaminophen (TYLENOL) tablet 975 mg, 975 mg, Oral, Q6H PRN, Debra Ozuna MD    [MAR Hold] ampicillin-sulbactam (UNASYN) 3 g in sodium chloride 0.9 % 100 mL IVPB, 3 g, Intravenous, Q6H, Matt Spence MD, Last Rate: 200 mL/hr at 10/21/23 0813, 3 g at 10/21/23 0813    [MAR Hold] ketorolac (TORADOL) injection 15 mg, 15 mg, Intramuscular, Q6H PRN, Debra Ozuna MD    PSH:   History reviewed. No pertinent surgical history. History reviewed. No pertinent family history. Review of Systems   HENT:  Positive for dental problem and facial swelling. All other systems reviewed and are negative. Temp:  [98.1 °F (36.7 °C)-99.1 °F (37.3 °C)] 98.8 °F (37.1 °C)  HR:  [] 90  Resp:  [18-24] 22  BP: (133-180)/() 158/97  SpO2:  [94 %-99 %] 98 %       Intake/Output Summary (Last 24 hours) at 10/21/2023 1102  Last data filed at 10/20/2023 1445  Gross per 24 hour   Intake 1100 ml   Output --   Net 1100 ml     Physical Exam:  Gen: AAOx3. NAD. Resp: Unlabored on RA. Cards: RRR. Neuro:  Bilateral CN V2-V3 grossly intact. Bilateral CN VII grossly intact. HEENT:   Extraoral:  Mild upper right facial swelling associated. No ecchymosis. TTP over right right zygoma nasolabial fold region. No LAD. Inferior border of the mandible is palpable. Intraoral: JUSTINE ~40mm. Occlusion stable and reproducible.  #8 class III mobile tooth. TTP along UR vestibule w erythema, purulence, and  draining fistula. No trismus. FOM soft, non-elevated, non-tender. No ecchymosis in the FOM. Uvula midline. Imaging: I have personally reviewed pertinent films in PACS    Assessment  26 y.o. male  evaluated for facial swelling. On bedside dental exam and radiographic findings, multiple periapical radiolucencies on teeth #1, 2, 3, 5. Class III mobile #8. Pt does not want # 8 extracted at this time. Plan for extraction of necessary teeth and I&D of upper right quadrant in OR. Plan:  - Antibiotics (unasyn 3g IV q6h then transition to augmentin 875-125mg BID PO upon discharge for 7d total course)  - Analgesia as per ED/primary team  - NPO/IVF for OR today  - Peridex 15mL swish and spit BID x7d  - Soft, mild dietafter sx  - Salt water rinses after meals  - Encourage good oral hygiene  - Head of bed elevated  - Ice to face as needed as need for 24-48 hours then transition to warm compresses as needed  - Sinus precautions: 4 weeks: no nose blowing, avoid putting pressure on sinus area, avoid strenuous activity/straining, try to sneeze with mouth open. Use OTC Afrin BID 2 sprays/nostril 3 days maximum as needed, OTC decongestant (e.g. Sudafed) or Antihistamine (e.g. Claritin-D) as needed, and saline nasal spray as needed. - 2 weeks: no straws, spitting, smoking after sx  - DVT ppx: SCD, OOB; please hold pharmacologic AC for the OR, can start post-op    D/w OMFS attg on call    Inpatient consult to Oral and Maxillofacial Surgery  Consult performed by: Berto Banks DMD  Consult ordered by: Mena Haddad MD           Counseling / Coordination of Care  Total floor / unit time spent today 30 minutes. Greater than 50% of total time was spent with the patient and / or family counseling and / or coordination of care.

## 2023-10-21 NOTE — OP NOTE
OPERATIVE REPORT  PATIENT NAME: Brad Porter    :  1990  MRN: [id number]  Pt Location: AN OR ROOM 01    SURGERY DATE: 10/21/2023    Surgeon(s) and Role:     * Capri Hernandes DMD - Primary    Preop Diagnosis:  Dental abscess [K04.7]  Cellulitis [L03.90]  Subperiosteal abscess of jaw [M27.2]    Post-Op Diagnosis Codes:     * Dental abscess [K04.7]     * Cellulitis [L03.90]     * Subperiosteal abscess of jaw [M27.2]    Procedure(s):  SURGICAL EXTRACTION TEETH MULTIPLE [de-identified]  Intraoral incision and drainage right maxillary vestibular abscess/Intraoral incision and drainage right maxillary canine space abscess  Intraoral incision and drainage right  space  Right - INCISION AND DRAINAGE (I&D) HEAD/FACE    Specimen(s):  ID Type Source Tests Collected by Time Destination   A : Right Maxilla Cultures Tissue Mouth ANAEROBIC CULTURE AND GRAM STAIN, CULTURE, TISSUE AND GRAM STAIN, WOUND CULTURE Capri Hernandes DMD 10/21/2023 1219        Estimated Blood Loss:   5 mL    Drains:  * No LDAs found *    Anesthesia Type:   General    Operative Indications:  Dental abscess [K04.7]  Cellulitis [L03.90]  Subperiosteal abscess of jaw [M322]  59-year-old male admitted with complaint of increasing pain and swelling in the right posterior maxilla over the past few days. Patient has no routine dental care. Patient with right vestibular and right canine space abscess. Patient with dentition in very poor condition. Patient with multiple abscessed teeth. Operative Findings:  Right cheek swelling with mild trismus    Complications:   None    Procedure and Technique:  The patient was identified in the preoperative holding area. The procedure was reviewed. All questions were answered. The patient was taken to the operating room and was placed supine on the operating room table. General anesthesia was induced and the patient was intubated via an oral endotracheal tube.   The patient was prepped and draped in the appropriate fashion. Local anesthesia was administered. A pharyngeal packing was placed. A full-thickness buccal sulcular flap was made from the distal at #1 to the mesial at #5. The flap was reflected in a subperiosteal plane. Buccal bone was removed at #1, #2, #3, and #5. Teeth #1, 2, 3, 5 were all extracted surgically without complication. Granulation tissue was removed. Sharp interseptal bone was removed with a rongeur. The alveolar ridge was smoothed with a bone file. The sites were cleaned and irrigated. The flap was replaced and sutured with multiple 3-0 Chromic Gut sutures. A #15 blade was used to make an incision over the height of fluctuance in the right maxillary vestibule. The incision was made through mucosa. The right maxillary vestibular space was entered and purulent drainage was established. Fluid was sent for culture and sensitivity. This area was irrigated with copious amounts of saline solution. Blunt dissection was then performed anteriorly and superiorly until the right maxillary canine space was entered. Additional purulent drainage was established. Blunt dissection was performed into the right  space. These sites were irrigated with copious amounts of saline solution. The pharyngeal packing was removed. There were no complications. The sponge, needle, and instrument count were consistent at the end of the procedure. The patient was extubated in the operating room and was transported to the recovery room without complication. I was present for the entire procedure.     Patient Disposition:  PACU         SIGNATURE: Saloni Hopkins DMD  DATE: October 21, 2023  TIME: 12:27 PM

## 2023-10-21 NOTE — ANESTHESIA PREPROCEDURE EVALUATION
Procedure:  EXTRACTION TEETH MULTIPLE (Mouth)  INCISION AND DRAINAGE (I&D) HEAD/FACE (Right: Face)    Relevant Problems   No relevant active problems        Physical Exam    Airway    Mallampati score: II  TM Distance: >3 FB  Neck ROM: full     Dental   No notable dental hx     Cardiovascular  Rhythm: regular, Rate: normal    Pulmonary   Breath sounds clear to auscultation    Other Findings        Anesthesia Plan  ASA Score- 2     Anesthesia Type- general with ASA Monitors. Additional Monitors:     Airway Plan: ETT. Plan Factors-Exercise tolerance (METS): >4 METS. Chart reviewed. EKG reviewed. Imaging results reviewed. Existing labs reviewed. Patient summary reviewed. Patient is a current smoker. Patient did not smoke on day of surgery. Obstructive sleep apnea risk education given perioperatively. Induction- intravenous. Postoperative Plan- Plan for postoperative opioid use. Informed Consent- Anesthetic plan and risks discussed with patient. I personally reviewed this patient with the CRNA. Discussed and agreed on the Anesthesia Plan with the CRNA. Tricia Mcfarland

## 2023-10-21 NOTE — ANESTHESIA POSTPROCEDURE EVALUATION
Post-Op Assessment Note    CV Status:  Stable  Pain Score: 0    Pain management: adequate  Multimodal analgesia used between 6 hours prior to anesthesia start to PACU discharge    Mental Status:  Alert and awake   Hydration Status:  Euvolemic   PONV Controlled:  Controlled   Airway Patency:  Patent  Airway: intubated      Post Op Vitals Reviewed: Yes      Staff: Anesthesiologist, CRNA         No notable events documented.     /94 (10/21/23 1236)    Temp 97.6 °F (36.4 °C) (10/21/23 1236)    Pulse (!) 109 (10/21/23 1236)   Resp 22 (10/21/23 1236)    SpO2 98 % (10/21/23 1236)

## 2023-10-21 NOTE — PROGRESS NOTES
[facility]  Progress Note  Name: Christina Noonan  MRN: [id number]  Unit/Bed#: W -39 I Date of Admission: 10/20/2023   Date of Service: 10/21/2023 I Hospital Day: 1    Assessment/Plan   * Subperiosteal abscess of jaw  Assessment & Plan  Patient presented with worsening right-sided facial swelling and tenderness. He said it has been progressing over the past 2 days. He said he has difficulty with eating/drinking. Denies having any prior history of dental abscess or or facial abscess. Otherwise denies having any fever/chills, headaches, changes in vision, rhinorrhea or cough. On exam he has a large amount of facial swelling and very tender to palpation. Labs were ordered and CBC, Pro-Osorio, lactic acid, BMP, PT/PTT unremarkable  EKG normal sinus rhythm heart rate of 99  CT facial bone with contrast:  1.3 cm subperiosteal abscess along right maxillary alveolar ridge adjacent to maxillary right first premolar tooth (likely odontogenic source of infection) with diffuse acute right facial cellulitis. Recommend dental consultation for further evaluation. 2.4 cm subcutaneous lesion in left lateral cheek region adjacent to the left parotid gland, may be inflamed sebaceous/epidermal inclusion cyst. Direct visualization recommended. Moderate right maxillary sinus disease. Periodontal disease in maxillary right molar teeth may be odontogenic source of sinusitis. Multiple bilateral mildly enlarged cervical lymph nodes, likely reactive cervical lymphadenopathy. Recommend clinical follow-up to resolution. In the ED patient received a dose of Unasyn, ketorolac 15 mg IV, 1 L bolus of IV fluids    Plan:  Tylenol for mild pain and ketorolac for moderate to severe pain as needed  Continue Unasyn 3g Q6H  Follow-up on blood cultures and a.m. labs  Pt now recovering s/p I&D subperiosteal abscess and tooth extraction preformed by OMFS.    Clear liquid diet, advance as tolerated per OMFS recommendations. VTE Pharmacologic Prophylaxis:   Low Risk (Score 0-2) - Encourage Ambulation. Patient Centered Rounds:  rounds w/ fm team  Discussions with Specialists or Other Care Team Provider: Dr. Madelin Hoff    Education and Discussions with Family / Patient: Patient declined call to . Current Length of Stay: 1 day(s)  Current Patient Status: Inpatient   Discharge Plan: Anticipate discharge in 24-48 hrs to home. Code Status: Level 1 - Full Code    Subjective:   Pt seen at bedside prior to procedure. He notes some mild facial pain but no other complaints. Objective:     Vitals:   Temp (24hrs), Av.1 °F (36.7 °C), Min:97.6 °F (36.4 °C), Max:99.1 °F (37.3 °C)    Temp:  [97.6 °F (36.4 °C)-99.1 °F (37.3 °C)] 97.6 °F (36.4 °C)  HR:  [] 98  Resp:  [20-24] 20  BP: (133-166)/() 143/95  SpO2:  [89 %-98 %] 94 %  There is no height or weight on file to calculate BMI. Input and Output Summary (last 24 hours): Intake/Output Summary (Last 24 hours) at 10/21/2023 1510  Last data filed at 10/21/2023 1223  Gross per 24 hour   Intake --   Output 5 ml   Net -5 ml       Physical Exam:   Physical Exam  Constitutional:       General: He is not in acute distress. Appearance: He is not ill-appearing or toxic-appearing. HENT:      Head: Normocephalic and atraumatic. Comments: Mild R facial tenderness     Nose: Nose normal.      Mouth/Throat:      Comments: Multiple dental caries  Eyes:      Extraocular Movements: Extraocular movements intact. Conjunctiva/sclera: Conjunctivae normal.   Cardiovascular:      Rate and Rhythm: Normal rate and regular rhythm. Pulses: Normal pulses. Heart sounds: Normal heart sounds. Pulmonary:      Effort: Pulmonary effort is normal.      Breath sounds: Normal breath sounds. Abdominal:      Palpations: Abdomen is soft. Tenderness: There is no abdominal tenderness. Skin:     General: Skin is warm and dry. Neurological:      Mental Status: He is alert and oriented to person, place, and time. Psychiatric:         Mood and Affect: Mood normal.         Behavior: Behavior normal.          Additional Data:     Labs:  Results from last 7 days   Lab Units 10/21/23  0513   WBC Thousand/uL 6.71   HEMOGLOBIN g/dL 14.4   HEMATOCRIT % 43.6   PLATELETS Thousands/uL 228   NEUTROS PCT % 67   LYMPHS PCT % 22   MONOS PCT % 10   EOS PCT % 1     Results from last 7 days   Lab Units 10/21/23  0513 10/20/23  1332   SODIUM mmol/L 135 134*   POTASSIUM mmol/L 3.8 3.8   CHLORIDE mmol/L 104 100   CO2 mmol/L 25 27   BUN mg/dL 7 9   CREATININE mg/dL 0.68 0.68   ANION GAP mmol/L 6 7   CALCIUM mg/dL 9.0 8.8   ALBUMIN g/dL  --  4.3   TOTAL BILIRUBIN mg/dL  --  0.85   ALK PHOS U/L  --  59   ALT U/L  --  14   AST U/L  --  19   GLUCOSE RANDOM mg/dL 90 92     Results from last 7 days   Lab Units 10/20/23  1332   INR  0.94     Results from last 7 days   Lab Units 10/21/23  1314   POC GLUCOSE mg/dl 99         Results from last 7 days   Lab Units 10/20/23  1332   LACTIC ACID mmol/L 1.4   PROCALCITONIN ng/ml <0.05       Lines/Drains:  Invasive Devices       Peripheral Intravenous Line  Duration             Peripheral IV 10/20/23 Left Wrist 1 day    Peripheral IV 10/20/23 Right;Upper Arm 1 day                          Imaging: Reviewed radiology reports from this admission including: CT facial bones    Recent Cultures (last 7 days):   Results from last 7 days   Lab Units 10/20/23  1332   BLOOD CULTURE  Received in Microbiology Lab. Culture in Progress. Received in Microbiology Lab. Culture in Progress.        Last 24 Hours Medication List:   Current Facility-Administered Medications   Medication Dose Route Frequency Provider Last Rate    acetaminophen  975 mg Oral Q6H PRN Andre Elder DMD      ampicillin-sulbactam  3 g Intravenous Q6H Andre Elder DMD 3 g (10/21/23 1409)    fentaNYL  12.5 mcg Intravenous Q3 min PRN Raj Gonzalez MD HYDROmorphone  0.25 mg Intravenous Q5 Min PRN Raj Gonzalez MD      ketorolac  15 mg Intramuscular Q6H PRN Andre Elder DMD      ondansetron  4 mg Intravenous Once PRN Raj Gonzalez MD          Today, Patient Was Seen By: Merari Mejia DO    **Please Note: This note may have been constructed using a voice recognition system. **

## 2023-10-21 NOTE — ASSESSMENT & PLAN NOTE
Patient presented with worsening right-sided facial swelling and tenderness. He said it has been progressing over the past 2 days. He said he has difficulty with eating/drinking. Denies having any prior history of dental abscess or or facial abscess. Otherwise denies having any fever/chills, headaches, changes in vision, rhinorrhea or cough. On exam he has a large amount of facial swelling and very tender to palpation. Labs were ordered and CBC, Pro-Osorio, lactic acid, BMP, PT/PTT unremarkable  EKG normal sinus rhythm heart rate of 99  CT facial bone with contrast:  1.3 cm subperiosteal abscess along right maxillary alveolar ridge adjacent to maxillary right first premolar tooth (likely odontogenic source of infection) with diffuse acute right facial cellulitis. Recommend dental consultation for further evaluation. 2.4 cm subcutaneous lesion in left lateral cheek region adjacent to the left parotid gland, may be inflamed sebaceous/epidermal inclusion cyst. Direct visualization recommended. Moderate right maxillary sinus disease. Periodontal disease in maxillary right molar teeth may be odontogenic source of sinusitis. Multiple bilateral mildly enlarged cervical lymph nodes, likely reactive cervical lymphadenopathy. Recommend clinical follow-up to resolution. In the ED patient received a dose of Unasyn, ketorolac 15 mg IV, 1 L bolus of IV fluids    Plan:  Tylenol for mild pain and ketorolac for moderate to severe pain as needed  Continue Unasyn 3g Q6H  Follow-up on blood cultures and a.m. labs  Pt now recovering s/p I&D subperiosteal abscess and tooth extraction preformed by OMFS. Clear liquid diet, advance as tolerated per OMFS recommendations.

## 2023-10-22 VITALS
SYSTOLIC BLOOD PRESSURE: 121 MMHG | OXYGEN SATURATION: 97 % | TEMPERATURE: 97.5 F | HEART RATE: 84 BPM | RESPIRATION RATE: 16 BRPM | WEIGHT: 140 LBS | DIASTOLIC BLOOD PRESSURE: 87 MMHG

## 2023-10-22 LAB
ANION GAP SERPL CALCULATED.3IONS-SCNC: 9 MMOL/L
BUN SERPL-MCNC: 6 MG/DL (ref 5–25)
CALCIUM SERPL-MCNC: 9.4 MG/DL (ref 8.4–10.2)
CHLORIDE SERPL-SCNC: 100 MMOL/L (ref 96–108)
CO2 SERPL-SCNC: 24 MMOL/L (ref 21–32)
CREAT SERPL-MCNC: 0.6 MG/DL (ref 0.6–1.3)
ERYTHROCYTE [DISTWIDTH] IN BLOOD BY AUTOMATED COUNT: 15.5 % (ref 11.6–15.1)
GFR SERPL CREATININE-BSD FRML MDRD: 130 ML/MIN/1.73SQ M
GLUCOSE SERPL-MCNC: 105 MG/DL (ref 65–140)
HCT VFR BLD AUTO: 41.7 % (ref 36.5–49.3)
HGB BLD-MCNC: 14.1 G/DL (ref 12–17)
MCH RBC QN AUTO: 29.2 PG (ref 26.8–34.3)
MCHC RBC AUTO-ENTMCNC: 33.8 G/DL (ref 31.4–37.4)
MCV RBC AUTO: 86 FL (ref 82–98)
PLATELET # BLD AUTO: 255 THOUSANDS/UL (ref 149–390)
PMV BLD AUTO: 9.5 FL (ref 8.9–12.7)
POTASSIUM SERPL-SCNC: 3.6 MMOL/L (ref 3.5–5.3)
RBC # BLD AUTO: 4.83 MILLION/UL (ref 3.88–5.62)
SODIUM SERPL-SCNC: 133 MMOL/L (ref 135–147)
WBC # BLD AUTO: 6.15 THOUSAND/UL (ref 4.31–10.16)

## 2023-10-22 PROCEDURE — 85027 COMPLETE CBC AUTOMATED: CPT

## 2023-10-22 PROCEDURE — 99238 HOSP IP/OBS DSCHRG MGMT 30/<: CPT | Performed by: INTERNAL MEDICINE

## 2023-10-22 PROCEDURE — 80048 BASIC METABOLIC PNL TOTAL CA: CPT

## 2023-10-22 RX ORDER — AMOXICILLIN AND CLAVULANATE POTASSIUM 875; 125 MG/1; MG/1
1 TABLET, FILM COATED ORAL EVERY 12 HOURS SCHEDULED
Qty: 8 TABLET | Refills: 0 | Status: SHIPPED | OUTPATIENT
Start: 2023-10-22 | End: 2023-10-26

## 2023-10-22 RX ADMIN — SODIUM CHLORIDE 3 G: 9 INJECTION, SOLUTION INTRAVENOUS at 12:13

## 2023-10-22 RX ADMIN — SODIUM CHLORIDE 3 G: 9 INJECTION, SOLUTION INTRAVENOUS at 04:25

## 2023-10-22 RX ADMIN — SODIUM CHLORIDE 3 G: 9 INJECTION, SOLUTION INTRAVENOUS at 08:21

## 2023-10-22 NOTE — ASSESSMENT & PLAN NOTE
Patient presented with worsening right-sided facial swelling and tenderness. He said it has been progressing over the past 2 days. He said he has difficulty with eating/drinking. Denies having any prior history of dental abscess or or facial abscess. Otherwise denies having any fever/chills, headaches, changes in vision, rhinorrhea or cough. On exam he has a large amount of facial swelling and very tender to palpation. Labs were ordered and CBC, Pro-Osorio, lactic acid, BMP, PT/PTT unremarkable  EKG normal sinus rhythm heart rate of 99  CT facial bone with contrast:  1.3 cm subperiosteal abscess along right maxillary alveolar ridge adjacent to maxillary right first premolar tooth (likely odontogenic source of infection) with diffuse acute right facial cellulitis. Recommend dental consultation for further evaluation. 2.4 cm subcutaneous lesion in left lateral cheek region adjacent to the left parotid gland, may be inflamed sebaceous/epidermal inclusion cyst. Direct visualization recommended. Moderate right maxillary sinus disease. Periodontal disease in maxillary right molar teeth may be odontogenic source of sinusitis. Multiple bilateral mildly enlarged cervical lymph nodes, likely reactive cervical lymphadenopathy. Recommend clinical follow-up to resolution. In the ED patient received a dose of Unasyn, ketorolac 15 mg IV, 1 L bolus of IV fluids    Plan:  Tylenol for mild pain and ketorolac for moderate to severe pain as needed  Received IV Unasyn inpatient  Discharged on 7d total course Augmentin thru 10/26/23  F/u with OMFS outpatient in 2 weeks, referral provided  Recommended establishing care with a PCP, referral to [facility] provided  Requires follow-up on wound culture final results  Pt now recovering s/p I&D subperiosteal abscess and tooth extraction preformed by OMFS.    Tolerated CLD

## 2023-10-22 NOTE — DISCHARGE SUMMARY
[facility]  Discharge- Dallas Brewster 1990, 26 y.o. male MRN: [id number]  Unit/Bed#: W -01 Encounter: [id number]  Primary Care Provider: No primary care provider on file. Date and time admitted to hospital: 10/20/2023 12:50 PM    * Subperiosteal abscess of jaw  Assessment & Plan  Patient presented with worsening right-sided facial swelling and tenderness. He said it has been progressing over the past 2 days. He said he has difficulty with eating/drinking. Denies having any prior history of dental abscess or or facial abscess. Otherwise denies having any fever/chills, headaches, changes in vision, rhinorrhea or cough. On exam he has a large amount of facial swelling and very tender to palpation. Labs were ordered and CBC, Pro-Osorio, lactic acid, BMP, PT/PTT unremarkable  EKG normal sinus rhythm heart rate of 99  CT facial bone with contrast:  1.3 cm subperiosteal abscess along right maxillary alveolar ridge adjacent to maxillary right first premolar tooth (likely odontogenic source of infection) with diffuse acute right facial cellulitis. Recommend dental consultation for further evaluation. 2.4 cm subcutaneous lesion in left lateral cheek region adjacent to the left parotid gland, may be inflamed sebaceous/epidermal inclusion cyst. Direct visualization recommended. Moderate right maxillary sinus disease. Periodontal disease in maxillary right molar teeth may be odontogenic source of sinusitis. Multiple bilateral mildly enlarged cervical lymph nodes, likely reactive cervical lymphadenopathy. Recommend clinical follow-up to resolution.   In the ED patient received a dose of Unasyn, ketorolac 15 mg IV, 1 L bolus of IV fluids    Plan:  Tylenol for mild pain and ketorolac for moderate to severe pain as needed  Received IV Unasyn inpatient  Discharged on 7d total course Augmentin thru 10/26/23  F/u with OMFS outpatient in 2 weeks, referral provided  Recommended establishing care with a PCP, referral to [facility] provided  Requires follow-up on wound culture final results  Pt now recovering s/p I&D subperiosteal abscess and tooth extraction preformed by OMFS. Tolerated CLD        Medical Problems       Resolved Problems  Date Reviewed: 10/21/2023   None       Discharging Resident: Brenda Silveira MD  Discharging Attending: Deya Morel MD  PCP: No primary care provider on file. Admission Date:   Admission Orders (From admission, onward)       Ordered        10/20/23 1803  [address]  Once                          Discharge Date: 10/22/23    Consultations During Hospital Stay:  OMFS    Procedures Performed:   10/21: Multiple teeth extractions 1,2,3,5  Intraoral I&D    Significant Findings / Test Results:   10/20/23: CT facial bones  1.3 cm subperiosteal abscess along right maxillary alveolar ridge adjacent to maxillary right first premolar tooth (likely odontogenic source of infection) with diffuse acute right facial cellulitis. 2.4 cm subcutaneous lesion in left lateral cheek region adjacent to the left parotid gland, may be inflamed sebaceous/epidermal inclusion cyst.  Moderate right maxillary sinus disease. Periodontal disease in maxillary right molar teeth may be odontogenic source of sinusitis. Multiple bilateral mildly enlarged cervical lymph nodes, likely reactive cervical lymphadenopathy. Recommend clinical follow-up to resolution. Incidental Findings:   None     Test Results Pending at Discharge (will require follow up): Outpatient Tests Requested:  Mouth cultures anaerobic and wound    Complications:  None    Reason for Admission: Subperiosteal abscess of jaw    Hospital Course:   Martínez Pressley is a 26 y.o. male patient who originally presented to the hospital on 10/20/2023 due to right-sided facial swelling and pain. Cultures were obtained and patient was treated with IV abx inpatient.  Patient was found to have a subperiosteal abscess along the right maxillary alveolar ridge with overlying facial cellulitis. Patient was discharged on POD#1 s/p multiple teeth extractions and I&D of the abscess on Augmentin BID, recommended finishing a 7-day total course. To follow-up with OMFS in 2 weeks. Referrals sent for OMFS and Family Practice to establish care with a PCP. Please see above list of diagnoses and related plan for additional information. Condition at Discharge: stable    Discharge Day Visit / Exam:   Subjective:  Patient feels much better, denies facial pain. Tolerating CLD w/o difficulty. Feels good enough to go home. Speaking without difficulty. Vitals: Blood Pressure: 121/87 (10/22/23 0830)  Pulse: 84 (10/22/23 0830)  Temperature: 97.5 °F (36.4 °C) (10/22/23 0830)  Temp Source: Temporal (10/21/23 1236)  Respirations: 16 (10/22/23 0830)  Weight - Scale: 63.5 kg (140 lb) (10/20/23 1255)  SpO2: 97 % (10/22/23 0830)  Exam:   Physical Exam  Constitutional:       General: He is not in acute distress. Appearance: He is not ill-appearing or toxic-appearing. HENT:      Head: Normocephalic and atraumatic. Comments: No facial tenderness on mild palpation  S/p multiple dental extractions     Nose: Nose normal.   Eyes:      Conjunctiva/sclera: Conjunctivae normal.   Cardiovascular:      Rate and Rhythm: Normal rate and regular rhythm. Pulses: Normal pulses. Heart sounds: Normal heart sounds. Pulmonary:      Effort: Pulmonary effort is normal.      Breath sounds: Normal breath sounds. Skin:     General: Skin is warm and dry. Neurological:      Mental Status: He is alert and oriented to person, place, and time. Psychiatric:         Mood and Affect: Mood normal.         Behavior: Behavior normal.          Discussion with Family: Patient declined call to . Discharge instructions/Information to patient and family:   See after visit summary for information provided to patient and family.       Provisions for Follow-Up Care:  See after visit summary for information related to follow-up care and any pertinent home health orders. Disposition:   Home    Planned Readmission: No    Discharge Medications:  See after visit summary for reconciled discharge medications provided to patient and/or family.       **Please Note: This note may have been constructed using a voice recognition system**

## 2023-10-22 NOTE — UTILIZATION REVIEW
Initial Clinical Review    Admission: Date/Time/Statement:   Admission Orders (From admission, onward)       Ordered        10/20/23 1737  INPATIENT ADMISSION  Once                          Orders Placed This Encounter   Procedures    INPATIENT ADMISSION     Standing Status:   Standing     Number of Occurrences:   1     Order Specific Question:   Level of Care     Answer:   Med Surg [16]     Order Specific Question:   Estimated length of stay     Answer:   More than 2 Midnights     Order Specific Question:   Certification     Answer:   I certify that inpatient services are medically necessary for this patient for a duration of greater than two midnights. See H&P and MD Progress Notes for additional information about the patient's course of treatment. ED Arrival Information       Expected   -    Arrival   10/20/2023 12:43    Acuity   Urgent              Means of arrival   Walk-In    Escorted by   Self    Service   Hospitalist    Admission type   Emergency              Arrival complaint   Oral Swelling             Chief Complaint   Patient presents with    Oral Swelling     Pt comes to ED c/o R dental swelling starting yesterday and worsening today. Initial Presentation: 26 y.o. male to presents self to ED presenting with worsening right-sided facial swelling and tenderness. Symptoms began 2 days ago with tenderness of one of his right superior molars, he does endorse abnormal dentition in this area. Yesterday, he began with some increasing pain in the right maxillary region, and when he awoke this morning he had significant swelling in the right maxillary region with worsening pain. No reported fevers. Pain with mastication. No prior history of dental abscess or facial abscess. He did not take any medication for discomfort prior to arrival.  He does not have any long-term medical problems, no allergies to medications.      EXAM  tachycardia, elevated BP,  right-sided facial swelling w pain over the maxillary region with some mild erythema at the site. Abnormal dentition with a dental carry of one of his superior molars w  gingival swelling, concern for a dental abscess. Tenderness in this region. CT reveals  large periapical radiolucencies at teeth #1, 2, 3, 5 and multiple other teeth in the mandible and contralateral side of the maxilla. #8 with advanced vertical bone loss and almost complete loss of facial cortex. Acute R facial cellulitis     Inpatient admission due to Abscess & R facial cellulitis  Per Consult OMFS IV Unasyn, pain management w OR in AM for Urgent extraction & I&D    Date: 10/21/2023  Day 2:   OMFS  Examination reveals swelling in the right nasolabial fold. There is right maxillary vestibular swelling. The dentition is in very poor condition. The patient has a large maxillary anterior diastema. Tooth #8 has a clinical mobility of 3. The patient was informed of this but he does not want the tooth extracted at this time. CT scan reveals large periapical radiolucencies at teeth #1, 2, 3, 5 and multiple other teeth in the mandible and contralateral side of the maxilla. #8 with advanced vertical bone loss and almost complete loss of facial cortex. Patient with multiple abscessed teeth maxillary right quadrant with associated right vestibular abscess and right canine space abscess. Patient requires urgent incision and drainage and extraction of multiple teeth in the operating room under general anesthesia.     SURGERY DATE: 10/21/2023   Preop Diagnosis:  Dental abscess [K04.7]  Cellulitis [L03.90]  Subperiosteal abscess of jaw [M27.2]     Post-Op Diagnosis Codes:     * Dental abscess [K04.7]     * Cellulitis [L03.90]     * Subperiosteal abscess of jaw [M27.2]     Procedure(s):  SURGICAL EXTRACTION TEETH MULTIPLE [de-identified]  Intraoral incision and drainage right maxillary vestibular abscess/Intraoral incision and drainage right maxillary canine space abscess  Intraoral incision and drainage right  space  Right - INCISION AND DRAINAGE (I&D) HEAD/FACE   Anesthesia Type:   General     Operative Indications:  Dental abscess [K04.7]  Cellulitis [L03.90]  Subperiosteal abscess of jaw [M322]  53-year-old male admitted with complaint of increasing pain and swelling in the right posterior maxilla over the past few days. Patient has no routine dental care. Patient with right vestibular and right canine space abscess. Patient with dentition in very poor condition. Patient with multiple abscessed teeth. Operative Findings:  Right cheek swelling with mild trismus    PM Note Attending MD  underwent surgical extraction of multiple teeth and drainage of the dental abscesses by OMFS. We will continue Unasyn for now. Follow-up on the culture results.   Patient feels significantly better tomorrow, can empirically be discharged on Augmentin  ED Triage Vitals [10/20/23 1255]   Temperature Pulse Respirations Blood Pressure SpO2   98.1 °F (36.7 °C) (!) 109 18 (!) 180/106 99 %      Temp Source Heart Rate Source Patient Position - Orthostatic VS BP Location FiO2 (%)   Oral Monitor -- -- --      Pain Score       8          Wt Readings from Last 1 Encounters:   10/20/23 63.5 kg (140 lb)     Additional Vital Signs:   Date/Time Temp Pulse Resp BP MAP (mmHg) SpO2 O2 Device Cardiac (WDL)   10/22/23 08:30:24 97.5 °F (36.4 °C) 84 16 121/87 98 97 % -- --   10/21/23 21:12:04 97.5 °F (36.4 °C) 87 -- 132/93 106 95 % -- --   10/21/23 16:11:54 98.6 °F (37 °C) 103 17 140/96 111 90 % -- --   10/21/23 14:13:26 -- 98 -- 143/95 111 94 % -- --   10/21/23 14:12:50 -- 93 -- 143/95 111 89 % Abnormal  -- --   10/21/23 13:30:36 -- 91 -- 136/97 110 94 % -- --   10/21/23 12:59:05 -- 95 -- 144/101 Abnormal  115 92 % -- --   10/21/23 1250 97.6 °F (36.4 °C) 98 20 162/88 -- 96 % None (Room air) --   10/21/23 1245 97.6 °F (36.4 °C) 101 20 162/87 -- 96 % None (Room air) --   10/21/23 1240 97.6 °F (36.4 °C) 109 Abnormal  20 166/93 -- 98 % None (Room air) --   10/21/23 1237 97.6 °F (36.4 °C) 114 Abnormal  22 164/94 -- 97 % None (Room air) WDL   10/21/23 1236 97.6 °F (36.4 °C) 109 Abnormal  22 164/94 -- 98 % None (Room air) --   10/21/23 1109 98.2 °F (36.8 °C) -- -- -- -- -- -- --   10/21/23 1059 -- 90 22 158/97 -- 98 % None (Room air) --   10/21/23 09:01:45 98.8 °F (37.1 °C) 86 24 Abnormal  136/96 109 95 % -- --   10/21/23 0512 99.1 °F (37.3 °C) -- -- -- -- -- -- --   10/20/23 21:53:10 98.6 °F (37 °C) 95 20 133/91 105 94 % -- --   10/20/23 18:32:47 98.6 °F (37 °C) 85 20 137/95 109 96 % -- --   10/20/23 1409 -- -- -- -- -- -- None (Room air) --   10/20/23 1400 -- 88 18 149/100 118 99 % -- --   10/20/23 1255 98.1 °F (36.7 °C) 109 Abnormal  18 180/106 Abnormal  -- 99 % None (Room air) --     Weights (last 14 days) before discharge    Date/Time Weight   10/20/23 1255 63.5 kg (140 lb)     Pertinent Labs/Diagnostic Test Results:   CT facial bones with contrast   Final Result by Rafaela Dove MD (10/20 1628)      1.3 cm subperiosteal abscess along right maxillary alveolar ridge adjacent to maxillary right first premolar tooth (likely odontogenic source of infection) with diffuse acute right facial cellulitis. Recommend dental consultation for further evaluation. 2.4 cm subcutaneous lesion in left lateral cheek region adjacent to the left parotid gland, may be inflamed sebaceous/epidermal inclusion cyst. Direct visualization recommended. Moderate right maxillary sinus disease. Periodontal disease in maxillary right molar teeth may be odontogenic source of sinusitis. Multiple bilateral mildly enlarged cervical lymph nodes, likely reactive cervical lymphadenopathy. Recommend clinical follow-up to resolution. The study was marked in [facility] for immediate notification.          Workstation performed: [id number]               Results from last 7 days   Lab Units 10/22/23  0436 10/21/23  0513 10/20/23  1332   WBC Thousand/uL 6.15 6.71 8. 10   HEMOGLOBIN g/dL 14.1 14.4 14.3   HEMATOCRIT % 41.7 43.6 42.7   PLATELETS Thousands/uL 255 228 240   NEUTROS ABS Thousands/µL  --  4.50 5.80         Results from last 7 days   Lab Units 10/22/23  0436 10/21/23  0513 10/20/23  1332   SODIUM mmol/L 133* 135 134*   POTASSIUM mmol/L 3.6 3.8 3.8   CHLORIDE mmol/L 100 104 100   CO2 mmol/L 24 25 27   ANION GAP mmol/L 9 6 7   BUN mg/dL 6 7 9   CREATININE mg/dL 0.60 0.68 0.68   EGFR ml/min/1.73sq m 130 123 123   CALCIUM mg/dL 9.4 9.0 8.8     Results from last 7 days   Lab Units 10/20/23  1332   AST U/L 19   ALT U/L 14   ALK PHOS U/L 59   TOTAL PROTEIN g/dL 8.2   ALBUMIN g/dL 4.3   TOTAL BILIRUBIN mg/dL 0.85     Results from last 7 days   Lab Units 10/21/23  1314   POC GLUCOSE mg/dl 99     Results from last 7 days   Lab Units 10/22/23  0436 10/21/23  0513 10/20/23  1332   GLUCOSE RANDOM mg/dL 105 90 92             No results found for: "BETA-HYDROXYBUTYRATE"                           Results from last 7 days   Lab Units 10/20/23  1332   PROTIME seconds 13.1   INR  0.94   PTT seconds 26         Results from last 7 days   Lab Units 10/20/23  1332   PROCALCITONIN ng/ml <0.05     Results from last 7 days   Lab Units 10/20/23  1332   LACTIC ACID mmol/L 1.4               Results from last 7 days   Lab Units 10/21/23  1219 10/20/23  1332   BLOOD CULTURE   --  No Growth at 24 hrs. No Growth at 24 hrs.    GRAM STAIN RESULT  2+ Polys*  Rare Gram negative rods*  Rare Gram positive cocci in chains*  --    WOUND CULTURE  Culture too young- will reincubate  --                    ED Treatment:   Medication Administration from 10/20/2023 1239 to 10/20/2023 1829         Date/Time Order Dose Route Action Action by Comments     10/20/2023 1407 EDT ampicillin-sulbactam (UNASYN) 3 g in sodium chloride 0.9 % 100 mL IVPB 0 g Intravenous Stopped Lisa Cleaning RN --     10/20/2023 3027 EDT ampicillin-sulbactam (UNASYN) 3 g in sodium chloride 0.9 % 100 mL IVPB 3 g Intravenous New Bag Madeline Сергей Dupont, RN --     10/20/2023 1337 EDT ketorolac (TORADOL) injection 15 mg 15 mg Intravenous Given Madeline Feldman RN --     10/20/2023 1445 EDT sodium chloride 0.9 % bolus 1,000 mL 0 mL Intravenous Stopped Agustinabena Alayna, RN --     10/20/2023 1335 EDT sodium chloride 0.9 % bolus 1,000 mL 1,000 mL Intravenous New Bag Madeline Feldman RN --     10/20/2023 1533 EDT iohexol (OMNIPAQUE) 350 MG/ML injection (MULTI-DOSE) 80 mL 80 mL Intravenous Given Serina Keyanna --          History reviewed. No pertinent past medical history. Present on Admission:  **None**      Admitting Diagnosis: Dental abscess [K04.7]  Cellulitis [L03.90]  Oral pain [K13.79]  Age/Sex: 26 y.o. male  Admission Orders:  NPO    Scheduled Medications:  ampicillin-sulbactam, 3 g, Intravenous, Q6H      Continuous IV Infusions:     PRN Meds:  acetaminophen, 975 mg, Oral, Q6H PRN  ketorolac, 15 mg, Intramuscular, Q6H PRN        IP CONSULT TO ORAL AND MAXILLOFACIAL SURGERY    Network Utilization Review Department  ATTENTION: Please call with any questions or concerns to [phone] and carefully listen to the prompts so that you are directed to the right person. All voicemails are confidential.   For Discharge needs, contact Care Management DC Support Team at [phone] opt. 2  Send all requests for admission clinical reviews, approved or denied determinations and any other requests to dedicated fax number below belonging to the campus where the patient is receiving treatment.  List of dedicated fax numbers for the Facilities:  [place] DENIALS (Administrative/Medical Necessity) [phone]   DISCHARGE SUPPORT TEAM (NETWORK) [id number] Juanito Bailey (Maternity/NICU/Pediatrics) [address] [facility] [address] [address] [facility] [address] [address] [facility] [address] [facility] [address] [place] [address] [address]  [phone]

## 2023-10-22 NOTE — DISCHARGE INSTR - AVS FIRST PAGE
Dear Lieutenant Calderon,     It was our pleasure to care for you here at [facility]. It is our hope that we were always able to exceed the expected standards for your care during your stay. You were hospitalized due to an abscess in your jaw and overlying cellulitis. You were cared for on the [facility] 4th floor by Durga Spaulding MD under the service of Claire Escobar MD with the [facility] who covers for your primary care physician (PCP), No primary care provider on file. , while you were hospitalized. If you have any questions or concerns related to this hospitalization, you may contact us at [phone]. For follow up as well as any medication refills, we recommend that you follow up with your primary care physician. A registered nurse will reach out to you by phone within a few days after your discharge to answer any additional questions that you may have after going home. However, at this time we provide for you here, the most important instructions / recommendations at discharge:     Notable Medication Adjustments -   Take augmentin for 4 more days (every 12 hours). Start tonight. Last day 10/26/23. Testing Required after Discharge -   None  ** Please contact your PCP to request testing orders for any of the testing recommended here **  Important follow up information -   Call and make an appointment with OMFS (oral maxillofacial surgery/specialized dentist) in 2 weeks  Other Instructions -   Recommend finding a PCP, will send a referral to see in about a week  Please review this entire after visit summary as additional general instructions including medication list, appointments, activity, diet, any pertinent wound care, and other additional recommendations from your care team that may be provided for you.       Sincerely,     Durga Spaulding MD

## 2023-10-22 NOTE — PLAN OF CARE
Problem: PAIN - ADULT  Goal: Verbalizes/displays adequate comfort level or baseline comfort level  Description: Interventions:  - Encourage patient to monitor pain and request assistance  - Assess pain using appropriate pain scale  - Administer analgesics based on type and severity of pain and evaluate response  - Implement non-pharmacological measures as appropriate and evaluate response  - Consider cultural and social influences on pain and pain management  - Notify physician/advanced practitioner if interventions unsuccessful or patient reports new pain  Outcome: Progressing     Problem: INFECTION - ADULT  Goal: Absence or prevention of progression during hospitalization  Description: INTERVENTIONS:  - Assess and monitor for signs and symptoms of infection  - Monitor lab/diagnostic results  - Monitor all insertion sites, i.e. indwelling lines, tubes, and drains  - Monitor endotracheal if appropriate and nasal secretions for changes in amount and color  - [place] appropriate cooling/warming therapies per order  - Administer medications as ordered  - Instruct and encourage patient and family to use good hand hygiene technique  - Identify and instruct in appropriate isolation precautions for identified infection/condition  Outcome: Progressing  Goal: Absence of fever/infection during neutropenic period  Description: INTERVENTIONS:  - Monitor WBC    Outcome: Progressing     Problem: Nutrition/Hydration-ADULT  Goal: Nutrient/Hydration intake appropriate for improving, restoring or maintaining nutritional needs  Description: Monitor and assess patient's nutrition/hydration status for malnutrition. Collaborate with interdisciplinary team and initiate plan and interventions as ordered. Monitor patient's weight and dietary intake as ordered or per policy. Utilize nutrition screening tool and intervene as necessary. Determine patient's food preferences and provide high-protein, high-caloric foods as appropriate. INTERVENTIONS:  - Monitor oral intake, urinary output, labs, and treatment plans  - Assess nutrition and hydration status and recommend course of action  - Evaluate amount of meals eaten  - Assist patient with eating if necessary   - Allow adequate time for meals  - Recommend/ encourage appropriate diets, oral nutritional supplements, and vitamin/mineral supplements  - Order, calculate, and assess calorie counts as needed  - Recommend, monitor, and adjust tube feedings and TPN/PPN based on assessed needs  - Assess need for intravenous fluids  - Provide specific nutrition/hydration education as appropriate  - Include patient/family/caregiver in decisions related to nutrition  Outcome: Progressing

## 2023-10-24 LAB
BACTERIA SPEC ANAEROBE CULT: NORMAL
BACTERIA WND AEROBE CULT: ABNORMAL
BACTERIA WND AEROBE CULT: ABNORMAL
GRAM STN SPEC: ABNORMAL

## 2023-10-25 LAB
BACTERIA BLD CULT: NORMAL
BACTERIA BLD CULT: NORMAL

## (undated) DEVICE — 2000CC GUARDIAN II: Brand: GUARDIAN

## (undated) DEVICE — GLOVE INDICATOR PI UNDERGLOVE SZ 7.5 BLUE

## (undated) DEVICE — GLOVE SRG BIOGEL ECLIPSE 7.5

## (undated) DEVICE — INTENDED FOR TISSUE SEPARATION, AND OTHER PROCEDURES THAT REQUIRE A SHARP SURGICAL BLADE TO PUNCTURE OR CUT.: Brand: BARD-PARKER ® CARBON RIB-BACK BLADES

## (undated) DEVICE — SYRINGE 10ML LL

## (undated) DEVICE — NEEDLE 25G X 1 1/2

## (undated) DEVICE — PACK PBDS MANDIBLE RF